# Patient Record
Sex: MALE | Race: BLACK OR AFRICAN AMERICAN | URBAN - METROPOLITAN AREA
[De-identification: names, ages, dates, MRNs, and addresses within clinical notes are randomized per-mention and may not be internally consistent; named-entity substitution may affect disease eponyms.]

---

## 2021-05-10 ENCOUNTER — HOSPITAL ENCOUNTER (EMERGENCY)
Age: 21
Discharge: LEFT BEFORE DISCHARGE | End: 2021-05-10
Payer: COMMERCIAL

## 2021-07-03 ENCOUNTER — APPOINTMENT (OUTPATIENT)
Dept: CT IMAGING | Age: 21
End: 2021-07-03
Payer: COMMERCIAL

## 2021-07-03 ENCOUNTER — HOSPITAL ENCOUNTER (EMERGENCY)
Age: 21
Discharge: HOME | End: 2021-07-03
Attending: EMERGENCY MEDICINE
Payer: COMMERCIAL

## 2021-07-03 VITALS
SYSTOLIC BLOOD PRESSURE: 128 MMHG | OXYGEN SATURATION: 100 % | RESPIRATION RATE: 16 BRPM | HEIGHT: 74 IN | TEMPERATURE: 97.2 F | HEART RATE: 57 BPM | DIASTOLIC BLOOD PRESSURE: 82 MMHG

## 2021-07-03 DIAGNOSIS — N02.9 IDIOPATHIC HEMATURIA, UNSPECIFIED WHETHER GLOMERULAR MORPHOLOGIC CHANGES PRESENT: Primary | ICD-10-CM

## 2021-07-03 LAB
-: NORMAL
ABSOLUTE EOS #: 0.06 K/UL (ref 0–0.44)
ABSOLUTE IMMATURE GRANULOCYTE: <0.03 K/UL (ref 0–0.3)
ABSOLUTE LYMPH #: 2.89 K/UL (ref 1.2–5.2)
ABSOLUTE MONO #: 0.46 K/UL (ref 0.1–1.4)
AMORPHOUS: NORMAL
ANION GAP SERPL CALCULATED.3IONS-SCNC: 6 MMOL/L (ref 9–17)
BACTERIA: NORMAL
BASOPHILS # BLD: 1 % (ref 0–2)
BASOPHILS ABSOLUTE: 0.03 K/UL (ref 0–0.2)
BILIRUBIN URINE: NEGATIVE
BUN BLDV-MCNC: 12 MG/DL (ref 6–20)
BUN/CREAT BLD: ABNORMAL (ref 9–20)
CALCIUM SERPL-MCNC: 9.5 MG/DL (ref 8.6–10.4)
CASTS UA: NORMAL /LPF (ref 0–8)
CHLORIDE BLD-SCNC: 104 MMOL/L (ref 98–107)
CO2: 28 MMOL/L (ref 20–31)
COLOR: ABNORMAL
COMMENT UA: ABNORMAL
CREAT SERPL-MCNC: 0.79 MG/DL (ref 0.7–1.2)
CRYSTALS, UA: NORMAL /HPF
DIFFERENTIAL TYPE: ABNORMAL
EOSINOPHILS RELATIVE PERCENT: 1 % (ref 1–4)
EPITHELIAL CELLS UA: NORMAL /HPF (ref 0–5)
GFR AFRICAN AMERICAN: >60 ML/MIN
GFR NON-AFRICAN AMERICAN: >60 ML/MIN
GFR SERPL CREATININE-BSD FRML MDRD: ABNORMAL ML/MIN/{1.73_M2}
GFR SERPL CREATININE-BSD FRML MDRD: ABNORMAL ML/MIN/{1.73_M2}
GLUCOSE BLD-MCNC: 104 MG/DL (ref 70–99)
GLUCOSE URINE: NEGATIVE
HCT VFR BLD CALC: 47.4 % (ref 40.7–50.3)
HEMOGLOBIN: 15.5 G/DL (ref 13–17)
IMMATURE GRANULOCYTES: 0 %
KETONES, URINE: ABNORMAL
LEUKOCYTE ESTERASE, URINE: ABNORMAL
LYMPHOCYTES # BLD: 46 % (ref 25–45)
MCH RBC QN AUTO: 32.8 PG (ref 25.2–33.5)
MCHC RBC AUTO-ENTMCNC: 32.7 G/DL (ref 28.4–34.8)
MCV RBC AUTO: 100.4 FL (ref 82.6–102.9)
MONOCYTES # BLD: 7 % (ref 2–8)
MUCUS: NORMAL
NITRITE, URINE: NEGATIVE
NRBC AUTOMATED: 0 PER 100 WBC
OTHER OBSERVATIONS UA: NORMAL
PDW BLD-RTO: 11.9 % (ref 11.8–14.4)
PH UA: 6 (ref 5–8)
PLATELET # BLD: 237 K/UL (ref 138–453)
PLATELET ESTIMATE: ABNORMAL
PMV BLD AUTO: 10.2 FL (ref 8.1–13.5)
POTASSIUM SERPL-SCNC: 3.8 MMOL/L (ref 3.7–5.3)
PROTEIN UA: ABNORMAL
RBC # BLD: 4.72 M/UL (ref 4.21–5.77)
RBC # BLD: ABNORMAL 10*6/UL
RBC UA: NORMAL /HPF (ref 0–4)
RENAL EPITHELIAL, UA: NORMAL /HPF
SEG NEUTROPHILS: 45 % (ref 34–64)
SEGMENTED NEUTROPHILS ABSOLUTE COUNT: 2.83 K/UL (ref 1.8–8)
SODIUM BLD-SCNC: 138 MMOL/L (ref 135–144)
SPECIFIC GRAVITY UA: 1.03 (ref 1–1.03)
TRICHOMONAS: NORMAL
TURBIDITY: CLEAR
URINE HGB: ABNORMAL
UROBILINOGEN, URINE: ABNORMAL
WBC # BLD: 6.3 K/UL (ref 4.5–13.5)
WBC # BLD: ABNORMAL 10*3/UL
WBC UA: NORMAL /HPF (ref 0–5)
YEAST: NORMAL

## 2021-07-03 PROCEDURE — 87491 CHLMYD TRACH DNA AMP PROBE: CPT

## 2021-07-03 PROCEDURE — 87591 N.GONORRHOEAE DNA AMP PROB: CPT

## 2021-07-03 PROCEDURE — 6360000002 HC RX W HCPCS

## 2021-07-03 PROCEDURE — 81001 URINALYSIS AUTO W/SCOPE: CPT

## 2021-07-03 PROCEDURE — 2580000003 HC RX 258

## 2021-07-03 PROCEDURE — 85025 COMPLETE CBC W/AUTO DIFF WBC: CPT

## 2021-07-03 PROCEDURE — 87086 URINE CULTURE/COLONY COUNT: CPT

## 2021-07-03 PROCEDURE — 96372 THER/PROPH/DIAG INJ SC/IM: CPT

## 2021-07-03 PROCEDURE — 80048 BASIC METABOLIC PNL TOTAL CA: CPT

## 2021-07-03 PROCEDURE — 74176 CT ABD & PELVIS W/O CONTRAST: CPT

## 2021-07-03 PROCEDURE — 99283 EMERGENCY DEPT VISIT LOW MDM: CPT

## 2021-07-03 RX ORDER — CEFTRIAXONE 1 G/1
1000 INJECTION, POWDER, FOR SOLUTION INTRAMUSCULAR; INTRAVENOUS ONCE
Status: DISCONTINUED | OUTPATIENT
Start: 2021-07-03 | End: 2021-07-03

## 2021-07-03 RX ORDER — 0.9 % SODIUM CHLORIDE 0.9 %
1000 INTRAVENOUS SOLUTION INTRAVENOUS ONCE
Status: COMPLETED | OUTPATIENT
Start: 2021-07-03 | End: 2021-07-03

## 2021-07-03 RX ORDER — CEPHALEXIN 500 MG/1
500 CAPSULE ORAL 4 TIMES DAILY
Qty: 28 CAPSULE | Refills: 0 | Status: SHIPPED | OUTPATIENT
Start: 2021-07-03 | End: 2021-07-10

## 2021-07-03 RX ORDER — DOXYCYCLINE HYCLATE 100 MG
100 TABLET ORAL 2 TIMES DAILY
Qty: 14 TABLET | Refills: 0 | Status: SHIPPED | OUTPATIENT
Start: 2021-07-03 | End: 2021-07-10

## 2021-07-03 RX ORDER — CEFTRIAXONE 500 MG/1
500 INJECTION, POWDER, FOR SOLUTION INTRAMUSCULAR; INTRAVENOUS ONCE
Status: COMPLETED | OUTPATIENT
Start: 2021-07-03 | End: 2021-07-03

## 2021-07-03 RX ADMIN — CEFTRIAXONE SODIUM 500 MG: 500 INJECTION, POWDER, FOR SOLUTION INTRAMUSCULAR; INTRAVENOUS at 13:18

## 2021-07-03 RX ADMIN — SODIUM CHLORIDE 1000 ML: 9 INJECTION, SOLUTION INTRAVENOUS at 11:20

## 2021-07-03 ASSESSMENT — ENCOUNTER SYMPTOMS
ALLERGIC/IMMUNOLOGIC NEGATIVE: 1
GASTROINTESTINAL NEGATIVE: 1
RESPIRATORY NEGATIVE: 1
EYES NEGATIVE: 1

## 2021-07-03 NOTE — ED PROVIDER NOTES
[facility]  ED  Emergency Department Encounter  EmergencyMedicine Resident     Pt Shi Brito  MRN: [id number]  [place] 2000  Date of evaluation: 7/3/21  PCP:  No primary care provider on file. This patient was evaluated in the Emergency Department for symptoms described in the history of present illness. The patient was evaluated in the context of the global COVID-19 pandemic, which necessitated consideration that the patient might be at risk for infection with the SARS-CoV-2 virus that causes COVID-19. Institutional protocols and algorithms that pertain to the evaluation of patients at risk for COVID-19 are in a state of rapid change based on information released by regulatory bodies including the CDC and federal and state organizations. These policies and algorithms were followed during the patient's care in the ED. CHIEF COMPLAINT       Chief Complaint   Patient presents with    Hematuria     x 1 day       HISTORY OF PRESENT ILLNESS  (Location/Symptom, Timing/Onset, Context/Setting, Quality, Duration, Modifying Factors, Severity.)      Luis Jonas is a 21 y.o. male who presents with 2 episodes of blood in urine at the begging of urination preceded yesterday by generalized lower abdomen pain. Pt reported a history of bladder cancer in his father. Pt smokes weed. PAST MEDICAL / SURGICAL / SOCIAL / FAMILY HISTORY      has no past medical history on file. has no past surgical history on file. Social History     Socioeconomic History    Marital status: Single     Spouse name: Not on file    Number of children: Not on file    Years of education: Not on file    Highest education level: Not on file   Occupational History    Not on file   Tobacco Use    Smoking status: Current Some Day Smoker     Types: Cigars    Smokeless tobacco: Never Used   Substance and Sexual Activity    Alcohol use:  Yes     Alcohol/week: 60.0 standard drinks     Types: 60 Shots of liquor per week     Comment: states he was drinking half a bottle almost every day last month    Drug use: Yes     Frequency: 7.0 times per week     Types: Marijuana    Sexual activity: Not on file   Other Topics Concern    Not on file   Social History Narrative    Not on file     Social Determinants of Health     Financial Resource Strain:     Difficulty of Paying Living Expenses:    Food Insecurity:     Worried About Running Out of Food in the Last Year:     Ran Out of Food in the Last Year:    Transportation Needs:     Lack of Transportation (Medical):  Lack of Transportation (Non-Medical):    Physical Activity:     Days of Exercise per Week:     Minutes of Exercise per Session:    Stress:     Feeling of Stress :    Social Connections:     Frequency of Communication with Friends and Family:     Frequency of Social Gatherings with Friends and Family:     Attends Latter day Services:     Active Member of Clubs or Organizations:     Attends Club or Organization Meetings:     Marital Status:    Intimate Partner Violence:     Fear of Current or Ex-Partner:     Emotionally Abused:     Physically Abused:     Sexually Abused:        History reviewed. No pertinent family history. Allergies:  Patient has no known allergies. Home Medications:  Prior to Admission medications    Not on File       REVIEW OF SYSTEMS    (2-9 systems for level 4, 10 or more for level 5)      Review of Systems   Constitutional: Negative. HENT: Negative. Eyes: Negative. Respiratory: Negative. Cardiovascular: Negative. Gastrointestinal: Negative. Endocrine: Negative. Genitourinary: Negative. Musculoskeletal: Negative. Skin: Negative. Allergic/Immunologic: Negative. Neurological: Negative. Hematological: Negative. Psychiatric/Behavioral: Negative.         PHYSICAL EXAM   (up to 7 for level 4, 8 or more for level 5)      INITIAL VITALS:   /82   Pulse 57   Temp 97.2 °F (36.2 °C) (Oral)   Resp 16   Ht 6' 2\" (1.88 m)   SpO2 100%     Physical Exam  Constitutional:       Appearance: Normal appearance. He is normal weight. Cardiovascular:      Rate and Rhythm: Normal rate and regular rhythm. Pulses: Normal pulses. Heart sounds: Normal heart sounds. Abdominal:      General: Abdomen is flat. Bowel sounds are normal.      Palpations: Abdomen is soft. Neurological:      Mental Status: He is alert and oriented to person, place, and time. Psychiatric:         Mood and Affect: Mood normal.         DIFFERENTIAL  DIAGNOSIS     PLAN (LABS / IMAGING / EKG):  CBC, BMP, UA, URINE CULTURE, ABDOMINAL CT WO CONTRAST    MEDICATIONS ORDERED:  No orders of the defined types were placed in this encounter. DIAGNOSTIC RESULTS / EMERGENCY DEPARTMENT COURSE / MDM   LAB RESULTS:  No results found for this visit on 07/03/21. EMERGENCY DEPARTMENT COURSE:  NORMAL SALINE BOLUS WAS GIVEN WHILE AWAITING LAB AND IMAGING RESULTS. PROCEDURES:  NONE        FINAL IMPRESSION      Idiopathic hematuria    DISPOSITION / PLAN     DISPOSITION        PATIENT REFERRED TO:  No follow-up provider specified.     DISCHARGE MEDICATIONS:  New Prescriptions    No medications on file       Basil Sanchez MD  Emergency Medicine Resident    (Please note that portions of thisnote were completed with a voice recognition program.  Efforts were made to edit the dictations but occasionally words are mis-transcribed.)     Basil Sanchez MD  Resident  07/03/21 [id number], MD  Resident  07/09/21 [id number]

## 2021-07-03 NOTE — ED PROVIDER NOTES
[facility]     Emergency Department     Faculty Attestation        I performed a history and physical examination of the patient and discussed management with the resident. I reviewed the residents note and agree with the documented findings and plan of care. Any areas of disagreement are noted on the chart. I was personally present for the key portions of any procedures. I have documented in the chart those procedures where I was not present during the key portions. I have reviewed the emergency nurses triage note. I agree with the chief complaint, past medical history, past surgical history, allergies, medications, social and family history as documented unless otherwise noted below. For Physician Assistant/ Nurse Practitioner cases/documentation I have personally evaluated this patient and have completed at least one if not all key elements of the E/M (history, physical exam, and MDM). Additional findings are as noted. Vital Signs: BP: 128/82  Pulse: 57  Resp: 16  Temp: 97.2 °F (36.2 °C) SpO2: 100 %  PCP:  No primary care provider on file. Pertinent Comments:     Patient is a 30-year-old male with excessive drinking in the past who noticed yesterday to have some blood in his urine as well as some lower abdominal cramping. Cramping is nonreproducible and only occurs intermittently. Denies any back pain or recent weight loss or night sweats but does have family history of bladder cancer from his father. Denies any testicle pain or anal pain and no anal bleeding. Also denies any chest pain or shortness of breath/fever/chills. On exam patient's abdomen is soft/nontender this time with no CVA tenderness and no rashes or bruising/ecchymosis. Specifically no McBurney's point or Alcocer sign at all. Genital examination yields no obvious blood at the meatus at this time and no testicular tenderness or other lesions seen. Assessment/plan: Patient with intermittent hematuria and lower abdominal discomfort. Will obtain basic laboratories including urinalysis with urine culture and CT abdomen/pelvis without IV or p.o. contrast renal stone protocol. Reevaluate after    Critical Care  None    This patient was evaluated in the Emergency Department for symptoms described in the history of present illness. He/she was evaluated in the context of the global COVID-19 pandemic, which necessitated consideration that the patient might be at risk for infection with the SARS-CoV-2 virus that causes COVID-19. Institutional protocols and algorithms that pertain to the evaluation of patients at risk for COVID-19 are in a state of rapid change based on information released by regulatory bodies including the CDC and federal and state organizations. These policies and algorithms were followed during the patient's care in the ED. (Please note that portions of this note were completed with a voice recognition program. Efforts were made to edit the dictations but occasionally words are mis-transcribed.  Whenever words are used in this note in any gender, they shall be construed as though they were used in the gender appropriate to the circumstances; and whenever words are used in this note in the singular or plural form, they shall be construed as though they were used in the form appropriate to the circumstances.)    MD Nathanael Lunsford  Attending Emergency Medicine Physician             Elise Snellen, MD  07/03/21 [address], [place]  07/03/21 [facility]

## 2021-07-03 NOTE — ED NOTES
Urine labeled and sent to lab. Pt requesting STD testing, resident notified, requesting orders to be placed for testing.       Juan Pablo Link RN  07/03/21 [id number]

## 2021-07-04 LAB
CULTURE: NORMAL
Lab: NORMAL
SPECIMEN DESCRIPTION: NORMAL

## 2021-07-05 LAB
C. TRACHOMATIS DNA ,URINE: ABNORMAL
N. GONORRHOEAE DNA, URINE: NEGATIVE
SPECIMEN DESCRIPTION: ABNORMAL